# Patient Record
Sex: FEMALE | Race: WHITE | NOT HISPANIC OR LATINO | URBAN - METROPOLITAN AREA
[De-identification: names, ages, dates, MRNs, and addresses within clinical notes are randomized per-mention and may not be internally consistent; named-entity substitution may affect disease eponyms.]

---

## 2024-11-11 ENCOUNTER — OFFICE VISIT (OUTPATIENT)
Dept: URGENT CARE | Age: 79
End: 2024-11-11
Payer: MEDICARE

## 2024-11-11 VITALS
DIASTOLIC BLOOD PRESSURE: 93 MMHG | SYSTOLIC BLOOD PRESSURE: 153 MMHG | HEART RATE: 143 BPM | RESPIRATION RATE: 21 BRPM | OXYGEN SATURATION: 95 % | WEIGHT: 171.2 LBS | TEMPERATURE: 98.5 F

## 2024-11-11 DIAGNOSIS — J01.00 ACUTE NON-RECURRENT MAXILLARY SINUSITIS: Primary | ICD-10-CM

## 2024-11-11 DIAGNOSIS — R06.02 SOB (SHORTNESS OF BREATH) ON EXERTION: ICD-10-CM

## 2024-11-11 PROCEDURE — 1159F MED LIST DOCD IN RCRD: CPT | Performed by: REGISTERED NURSE

## 2024-11-11 PROCEDURE — 99203 OFFICE O/P NEW LOW 30 MIN: CPT | Performed by: REGISTERED NURSE

## 2024-11-11 RX ORDER — AMOXICILLIN AND CLAVULANATE POTASSIUM 875; 125 MG/1; MG/1
1 TABLET, FILM COATED ORAL EVERY 12 HOURS
Qty: 14 TABLET | Refills: 0 | Status: SHIPPED | OUTPATIENT
Start: 2024-11-11 | End: 2024-11-18

## 2024-11-11 RX ORDER — METHYLPREDNISOLONE 4 MG/1
TABLET ORAL
Qty: 21 TABLET | Refills: 0 | Status: SHIPPED | OUTPATIENT
Start: 2024-11-11

## 2024-11-11 NOTE — PROGRESS NOTES
Subjective   Patient ID: Karly Mancuso is a 78 y.o. female. They present today with a chief complaint of Cough (Coughing congestion x 4 weeks).    History of Present Illness  78-year-old female presents accompanied by her  for complaints of cough and congestion x 4 weeks.  Patient describes her cough is harsh and nonproductive.  She states she feels as if everything is sitting in her throat and upper chest and she is just not able to cough it out.  She states she is having some sinus pain and pressure along with a mild headache.  She states she is having periods of shortness of breath especially after a coughing attack or with exertion.  She denies any fevers/chills, sore throat, chest pain.      History provided by:  Patient      Past Medical History  Allergies as of 11/11/2024    (No Known Allergies)       (Not in a hospital admission)       No past medical history on file.    No past surgical history on file.         Review of Systems  Review of Systems   All other systems reviewed and are negative.                                 Objective    Vitals:    11/11/24 1655   BP: (!) 153/93   Pulse: (!) 143   Resp: 21   Temp: 36.9 °C (98.5 °F)   SpO2: 95%   Weight: 77.7 kg (171 lb 3.2 oz)     No LMP recorded.    Physical Exam  Vitals and nursing note reviewed.   Constitutional:       Appearance: Normal appearance.   HENT:      Head: Normocephalic.      Right Ear: Hearing, tympanic membrane, ear canal and external ear normal.      Left Ear: Hearing, tympanic membrane, ear canal and external ear normal.      Nose: Nasal tenderness, mucosal edema, congestion and rhinorrhea present. Rhinorrhea is purulent.      Right Turbinates: Enlarged and swollen.      Left Turbinates: Enlarged and swollen.      Right Sinus: Maxillary sinus tenderness and frontal sinus tenderness present.      Left Sinus: Maxillary sinus tenderness and frontal sinus tenderness present.      Mouth/Throat:      Mouth: Mucous membranes are moist.       Pharynx: Posterior oropharyngeal erythema and postnasal drip present.   Eyes:      Pupils: Pupils are equal, round, and reactive to light.   Cardiovascular:      Rate and Rhythm: Normal rate and regular rhythm.   Pulmonary:      Effort: Pulmonary effort is normal.      Breath sounds: Examination of the right-lower field reveals decreased breath sounds. Examination of the left-lower field reveals decreased breath sounds. Decreased breath sounds present.   Abdominal:      General: Bowel sounds are normal.      Palpations: Abdomen is soft.   Skin:     General: Skin is warm and dry.      Capillary Refill: Capillary refill takes less than 2 seconds.   Neurological:      General: No focal deficit present.      Mental Status: She is alert.   Psychiatric:         Mood and Affect: Mood normal.         Procedures    Point of Care Test & Imaging Results from this visit  No results found for this visit on 11/11/24.   No results found.    Diagnostic study results (if any) were reviewed by Crystal L Severino, APRN-CNP.    Assessment/Plan   Allergies, medications, history, and pertinent labs/EKGs/Imaging reviewed by Crystal L Severino, APRN-CNP.     Medical Decision Making  78-year-old female presents accompanied by her  for complaints of cough and congestion x 4 weeks.  Patient describes her cough is harsh and nonproductive.  She states she feels as if everything is sitting in her throat and upper chest and she is just not able to cough it out.  She states she is having some sinus pain and pressure along with a mild headache.  She states she is having periods of shortness of breath especially after a coughing attack or with exertion.  She denies any fevers/chills, sore throat, chest pain.  Vital signs are stable, afebrile.  Chest clear, heart is regular, belly soft and nontender.  ENT and pulm exam as above consistent with acute sinusitis with SOB on exertion.  Patient is discharged to home.  Patient is educated on  supportive care, return/ER precautions.  Patient is to follow-up with PCP should symptoms persist or worsen, verbalizes understanding has no further questions  Augmentin  Medrol dose weston          Orders and Diagnoses  Diagnoses and all orders for this visit:  Acute non-recurrent maxillary sinusitis  -     methylPREDNISolone (Medrol Dospak) 4 mg tablets; Take as directed on package.  -     amoxicillin-pot clavulanate (Augmentin) 875-125 mg tablet; Take 1 tablet by mouth every 12 hours for 7 days.  SOB (shortness of breath) on exertion  -     methylPREDNISolone (Medrol Dospak) 4 mg tablets; Take as directed on package.  -     amoxicillin-pot clavulanate (Augmentin) 875-125 mg tablet; Take 1 tablet by mouth every 12 hours for 7 days.      Medical Admin Record      Patient disposition: Home    Electronically signed by Crystal L Severino, APRN-CNP  5:36 PM